# Patient Record
Sex: MALE | Race: WHITE | URBAN - METROPOLITAN AREA
[De-identification: names, ages, dates, MRNs, and addresses within clinical notes are randomized per-mention and may not be internally consistent; named-entity substitution may affect disease eponyms.]

---

## 2017-06-09 ENCOUNTER — HOSPITAL ENCOUNTER (OUTPATIENT)
Dept: GENERAL RADIOLOGY | Facility: CLINIC | Age: 27
Discharge: HOME | End: 2017-06-09
Attending: INTERNAL MEDICINE | Admitting: INTERNAL MEDICINE
Payer: COMMERCIAL

## 2017-06-09 DIAGNOSIS — R76.11 POSITIVE PPD: ICD-10-CM

## 2017-06-09 PROCEDURE — 71020 XR CHEST 2 VW: CPT

## 2019-01-09 ENCOUNTER — OFFICE VISIT (OUTPATIENT)
Dept: ORTHOPEDICS | Facility: CLINIC | Age: 29
End: 2019-01-09
Payer: COMMERCIAL

## 2019-01-09 ENCOUNTER — ANCILLARY PROCEDURE (OUTPATIENT)
Dept: GENERAL RADIOLOGY | Facility: CLINIC | Age: 29
End: 2019-01-09
Payer: COMMERCIAL

## 2019-01-09 VITALS
WEIGHT: 211 LBS | SYSTOLIC BLOOD PRESSURE: 122 MMHG | BODY MASS INDEX: 24.91 KG/M2 | HEIGHT: 77 IN | DIASTOLIC BLOOD PRESSURE: 78 MMHG

## 2019-01-09 DIAGNOSIS — M79.644 PAIN OF FINGER OF RIGHT HAND: ICD-10-CM

## 2019-01-09 DIAGNOSIS — M79.644 PAIN OF FINGER OF RIGHT HAND: Primary | ICD-10-CM

## 2019-01-09 RX ORDER — FLUTICASONE PROPIONATE 50 MCG
1 SPRAY, SUSPENSION (ML) NASAL DAILY
COMMUNITY

## 2019-01-09 RX ORDER — MONTELUKAST SODIUM 10 MG/1
10 TABLET ORAL AT BEDTIME
COMMUNITY

## 2019-01-09 ASSESSMENT — MIFFLIN-ST. JEOR: SCORE: 2054.47

## 2019-01-09 NOTE — LETTER
1/9/2019       RE: Ayaz Carranza  [address]  [facility] [address]     Dear Colleague,    Thank you for referring your patient, Ayaz Carranza, to the [facility]IN[facility] at [facility]. Please see a copy of my visit note below.         NEW PATIENT INTAKE QUESTIONNAIRE  SPORTS & ORTHOPEDIC WALK-IN 1/9/2019    Primary Care Physician:      Where are the majority of your medical records?     Right ring finger pain since Saturday (1/5). While bouldering he was holding onto a small rock and his DIP went into extreme extension and heard a pop. He continued on and ignored it. After the climb he had swelling and noted he could flex as far as his other hand. He has had an increase in pain. He tried climbing yesterday and feels weaker and still decreased ROM. He has throbbing pain after. He goes about 4x/weeks and wants to know if he can continue to workout.     Reason for Visit:    What part of your body is injured / painful?  right ring finger     What caused the injury /pain? Bouldering     How long ago did your injury occur or pain begin? 1/5/19    What are your most bothersome symptoms? Pain and Swelling    How would you characterize your symptom? throbing    What makes your symptoms better? Rest and Ice    What makes your symptoms worse? Movement    Have you been previously seen for this problem? No    Medical History:    Medical History:     Have you had surgery on this body part before? No    Medications: Flonase and sigulaire     Allergies: No known drug allergies    Family History of Medical Problems:     Previous Surgeries:     Social History:    Occupation:      Handedness: Right    Exercise: 4-5 days/week    Review of Systems:    Have you recently had a a fever, chills, weight loss? No    Do you have any vision problems? No    Do you have any chest pain or edema? No    Do you have any shortness of breath or  wheezing?  No    Do you have stomach problems? No    Do you have any numbness or focal weakness? No    Do you have diabetes? No    Do you have problems with bleeding or clotting? No    Do you have an rashes or other skin lesions? No            CHIEF COMPLAINT:  Pain of the Right Ring Finger     HISTORY OF PRESENT ILLNESS  Mr. Carranza is a pleasant 28 year old year old male who presents to clinic today with pain of right ring finger  Ayaz explains that pain began acutely on Saturday 1/5/19.  He was bouldering at his [employer] club when his right hand slipped from a small rock.  He immediately felt intense pain of 4th digit DIP region. Unsure but thought he heard either a snap of his finger or finger striking the wall.  After this time noted swelling and stiffness with flexion. Throbbing pain. This pain and motion has improved over time but he notes pain of flexion with resistance.  Swelling resolved. No bruising.     Rest from climbing.     Additional history: as documented    MEDICAL HISTORY  There is no problem list on file for this patient.      Current Outpatient Medications   Medication Sig Dispense Refill     fluticasone (FLONASE) 50 MCG/ACT nasal spray Spray 1 spray into both nostrils daily       montelukast (SINGULAIR) 10 MG tablet Take 10 mg by mouth At Bedtime         No Known Allergies    No family history on file.    Additional medical/Social/Surgical histories reviewed in [facility] and updated as appropriate.     REVIEW OF SYSTEMS (1/9/2019)  CONSTITUTIONAL: Denies fever and weight loss  EYES: Denies acute vision changes  ENT: Denies hearing changes or difficulty swallowing  CARDIAC: Denies chest pain or edema  RESPIRATORY: Denies dyspnea, cough or wheeze  GASTROINTESTINAL: Denies abdominal pain, nausea, vomiting  MUSCULOSKELETAL: See HPI  SKIN: Denies any recent rash or lesion  NEUROLOGICAL: Denies numbness or focal weakness  PSYCHIATRIC: No history of psychiatric symptoms or problems  ENDOCRINE:  "Diagnosis of diabetes:No  HEMATOLOGY: Denies episodes of easy bleeding      PHYSICAL EXAM  /78   Ht 1.956 m (6' 5\")   Wt 95.7 kg (211 lb)   BMI 25.02 kg/m       General  - normal appearance, in no obvious distress  CV  - normal radial pulse  Pulm  - normal respiratory pattern, non-labored  Musculoskeletal - Right ring finger  - inspection: normal joint alignment, no swelling  - palpation: Mild tenderness at volar aspect of DIP joint  - ROM:  MCP 90 deg flexion   0 deg extension    deg flexion   0 deg extension   DIP 80 deg flexion   0 deg extension  - strength: 5/5  strength, 5/5 wrist abduction, 5/5 flexion, extension, pronation, supination, adduction  - special tests DIP  (-) varus  (-) valgus  Neuro  - no numbness, no motor deficit, grossly normal coordination, normal muscle tone  Skin  - no ecchymosis, erythema, warmth, or induration, no obvious rash  Psych  - interactive, appropriate, normal mood and affect    IMAGING : XR finger 3v. Final results and radiologist's interpretation, available in the [facility] health record. Images were reviewed with the patient/family members in the office today. My personal interpretation of the performed imaging is no acute osseous abnormalities.    US LTD IN OFFICE: flexor tendon intact at insertion, no obvious partial tears of flexor tendon at middle or distal phalanx.      ASSESSMENT & PLAN  Mr. Carranza is a 28 year old year old male who presents to clinic today with pain and swelling of 4th ring finger after his hand slipped from a rock while bouldering at his gym. DDX includes flexor digitorum profundus tendon strain, injury to A4 or A5 flexor pulley. No findings to suggest FDP tear, given full ROM will treat conservatively    Diagnosis: Pain of right ring finger.     -Rest, ice  -Ibuprofen PRN  -Consider taping when returning to bouldering  -Avoidance of climbing with resisted finger flexion  -Follow up PRN 1 month    It was a pleasure seeing Ayaz " today.    Philip Hollis DO, [facility]  Primary Care Sports Medicine      Again, thank you for allowing me to participate in the care of your patient.      Sincerely,    Philip Hollis DO

## 2019-01-09 NOTE — LETTER
Date:January 10, 2019      Patient was self referred, no letter generated. Do not send.        [facility] Health Information

## 2019-01-09 NOTE — PROGRESS NOTES
NEW PATIENT INTAKE QUESTIONNAIRE  SPORTS & ORTHOPEDIC WALK-IN 1/9/2019    Primary Care Physician:      Where are the majority of your medical records?     Right ring finger pain since Saturday (1/5). While bouldering he was holding onto a small rock and his DIP went into extreme extension and heard a pop. He continued on and ignored it. After the climb he had swelling and noted he could flex as far as his other hand. He has had an increase in pain. He tried climbing yesterday and feels weaker and still decreased ROM. He has throbbing pain after. He goes about 4x/weeks and wants to know if he can continue to workout.     Reason for Visit:    What part of your body is injured / painful?  right ring finger     What caused the injury /pain? Bouldering     How long ago did your injury occur or pain begin? 1/5/19    What are your most bothersome symptoms? Pain and Swelling    How would you characterize your symptom? throbing    What makes your symptoms better? Rest and Ice    What makes your symptoms worse? Movement    Have you been previously seen for this problem? No    Medical History:    Medical History:     Have you had surgery on this body part before? No    Medications: Flonase and sigulaire     Allergies: No known drug allergies    Family History of Medical Problems:     Previous Surgeries:     Social History:    Occupation:      Handedness: Right    Exercise: 4-5 days/week    Review of Systems:    Have you recently had a a fever, chills, weight loss? No    Do you have any vision problems? No    Do you have any chest pain or edema? No    Do you have any shortness of breath or wheezing?  No    Do you have stomach problems? No    Do you have any numbness or focal weakness? No    Do you have diabetes? No    Do you have problems with bleeding or clotting? No    Do you have an rashes or other skin lesions? No

## 2019-01-09 NOTE — PROGRESS NOTES
"CHIEF COMPLAINT:  Pain of the Right Ring Finger     HISTORY OF PRESENT ILLNESS  Mr. Carranza is a pleasant 28 year old year old male who presents to clinic today with pain of right ring finger  Ayaz explains that pain began acutely on Saturday 1/5/19.  He was bouldering at his [email or web address] club when his right hand slipped from a small rock.  He immediately felt intense pain of 4th digit DIP region. Unsure but thought he heard either a snap of his finger or finger striking the wall.  After this time noted swelling and stiffness with flexion. Throbbing pain. This pain and motion has improved over time but he notes pain of flexion with resistance.  Swelling resolved. No bruising.     Rest from climbing.     Additional history: as documented    MEDICAL HISTORY  There is no problem list on file for this patient.      Current Outpatient Medications   Medication Sig Dispense Refill     fluticasone (FLONASE) 50 MCG/ACT nasal spray Spray 1 spray into both nostrils daily       montelukast (SINGULAIR) 10 MG tablet Take 10 mg by mouth At Bedtime         No Known Allergies    No family history on file.    Additional medical/Social/Surgical histories reviewed in [facility] and updated as appropriate.     REVIEW OF SYSTEMS (1/9/2019)  CONSTITUTIONAL: Denies fever and weight loss  EYES: Denies acute vision changes  ENT: Denies hearing changes or difficulty swallowing  CARDIAC: Denies chest pain or edema  RESPIRATORY: Denies dyspnea, cough or wheeze  GASTROINTESTINAL: Denies abdominal pain, nausea, vomiting  MUSCULOSKELETAL: See HPI  SKIN: Denies any recent rash or lesion  NEUROLOGICAL: Denies numbness or focal weakness  PSYCHIATRIC: No history of psychiatric symptoms or problems  ENDOCRINE: Diagnosis of diabetes:No  HEMATOLOGY: Denies episodes of easy bleeding      PHYSICAL EXAM  /78   Ht 1.956 m (6' 5\")   Wt 95.7 kg (211 lb)   BMI 25.02 kg/m      General  - normal appearance, in no obvious distress  CV  - normal radial " pulse  Pulm  - normal respiratory pattern, non-labored  Musculoskeletal - Right ring finger  - inspection: normal joint alignment, no swelling  - palpation: Mild tenderness at volar aspect of DIP joint  - ROM:  MCP 90 deg flexion   0 deg extension    deg flexion   0 deg extension   DIP 80 deg flexion   0 deg extension  - strength: 5/5  strength, 5/5 wrist abduction, 5/5 flexion, extension, pronation, supination, adduction  - special tests DIP  (-) varus  (-) valgus  Neuro  - no numbness, no motor deficit, grossly normal coordination, normal muscle tone  Skin  - no ecchymosis, erythema, warmth, or induration, no obvious rash  Psych  - interactive, appropriate, normal mood and affect    IMAGING : XR finger 3v. Final results and radiologist's interpretation, available in the [facility] health record. Images were reviewed with the patient/family members in the office today. My personal interpretation of the performed imaging is no acute osseous abnormalities.    US LTD IN OFFICE: flexor tendon intact at insertion, no obvious partial tears of flexor tendon at middle or distal phalanx.      ASSESSMENT & PLAN  Mr. Carranza is a 28 year old year old male who presents to clinic today with pain and swelling of 4th ring finger after his hand slipped from a rock while bouldering at his gym. DDX includes flexor digitorum profundus tendon strain, injury to A4 or A5 flexor pulley. No findings to suggest FDP tear, given full ROM will treat conservatively    Diagnosis: Pain of right ring finger.     -Rest, ice  -Ibuprofen PRN  -Consider taping when returning to bouldering  -Avoidance of climbing with resisted finger flexion  -Follow up PRN 1 month    It was a pleasure seeing Ayaz today.    Phliip Hollis DO, CAQSM  Primary Care Sports Medicine